# Patient Record
Sex: MALE | Race: OTHER
[De-identification: names, ages, dates, MRNs, and addresses within clinical notes are randomized per-mention and may not be internally consistent; named-entity substitution may affect disease eponyms.]

---

## 2017-07-21 ENCOUNTER — HOSPITAL ENCOUNTER (OUTPATIENT)
Dept: HOSPITAL 62 - OD | Age: 22
End: 2017-07-21
Attending: NURSE PRACTITIONER
Payer: COMMERCIAL
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
## 2017-07-21 DIAGNOSIS — S99.911A: Primary | ICD-10-CM

## 2017-07-21 NOTE — RADIOLOGY REPORT (SQ)
EXAM DESCRIPTION:  ANKLE RIGHT COMPLETE



COMPLETED DATE/TIME:  7/21/2017 1:38 pm



REASON FOR STUDY:  UNSPECIFIED INJURY OF RIGHT ANKLE, INITIAL ENCOUNTER S99.911A  UNSPECIFIED INJURY 
OF RIGHT ANKLE, INITIAL ENCOUNTE



COMPARISON:  None.



NUMBER OF VIEWS:  Three views.



TECHNIQUE:  AP, lateral, and oblique radiographic images acquired of the right ankle.



LIMITATIONS:  None.



FINDINGS:  MINERALIZATION: Normal.

BONES: No acute fracture or dislocation.  No worrisome bone lesions.

JOINTS: No effusions.

SOFT TISSUES: No soft tissue swelling. No foreign body.

OTHER: No other significant finding.



IMPRESSION:  NEGATIVE STUDY OF THE RIGHT ANKLE. NO RADIOGRAPHIC EVIDENCE OF ACUTE INJURY.



TECHNICAL DOCUMENTATION:  JOB ID:  [id number]

 2011 [employer]- All Rights Reserved

## 2018-08-30 ENCOUNTER — HOSPITAL ENCOUNTER (EMERGENCY)
Dept: HOSPITAL 62 - ER | Age: 23
Discharge: HOME | End: 2018-08-30
Payer: COMMERCIAL

## 2018-08-30 VITALS — DIASTOLIC BLOOD PRESSURE: 72 MMHG | SYSTOLIC BLOOD PRESSURE: 125 MMHG

## 2018-08-30 DIAGNOSIS — R19.7: ICD-10-CM

## 2018-08-30 DIAGNOSIS — R53.83: ICD-10-CM

## 2018-08-30 DIAGNOSIS — R10.84: ICD-10-CM

## 2018-08-30 DIAGNOSIS — K92.1: ICD-10-CM

## 2018-08-30 DIAGNOSIS — F12.10: ICD-10-CM

## 2018-08-30 DIAGNOSIS — F17.200: ICD-10-CM

## 2018-08-30 DIAGNOSIS — R11.2: Primary | ICD-10-CM

## 2018-08-30 LAB
ADD MANUAL DIFF: NO
ALBUMIN SERPL-MCNC: 4.5 G/DL (ref 3.5–5)
ALP SERPL-CCNC: 36 U/L (ref 38–126)
ALT SERPL-CCNC: 39 U/L (ref 21–72)
ANION GAP SERPL CALC-SCNC: 12 MMOL/L (ref 5–19)
APPEARANCE UR: CLEAR
APTT PPP: YELLOW S
AST SERPL-CCNC: 72 U/L (ref 17–59)
BASOPHILS # BLD AUTO: 0.1 10^3/UL (ref 0–0.2)
BASOPHILS NFR BLD AUTO: 0.9 % (ref 0–2)
BILIRUB DIRECT SERPL-MCNC: 0.3 MG/DL (ref 0–0.4)
BILIRUB SERPL-MCNC: 0.8 MG/DL (ref 0.2–1.3)
BILIRUB UR QL STRIP: NEGATIVE
BUN SERPL-MCNC: 17 MG/DL (ref 7–20)
CALCIUM: 9.6 MG/DL (ref 8.4–10.2)
CHLORIDE SERPL-SCNC: 105 MMOL/L (ref 98–107)
CO2 SERPL-SCNC: 26 MMOL/L (ref 22–30)
EOSINOPHIL # BLD AUTO: 0 10^3/UL (ref 0–0.6)
EOSINOPHIL NFR BLD AUTO: 0.5 % (ref 0–6)
ERYTHROCYTE [DISTWIDTH] IN BLOOD BY AUTOMATED COUNT: 13.6 % (ref 11.5–14)
GLUCOSE SERPL-MCNC: 87 MG/DL (ref 75–110)
GLUCOSE UR STRIP-MCNC: NEGATIVE MG/DL
HCT VFR BLD CALC: 44 % (ref 37.9–51)
HGB BLD-MCNC: 14.4 G/DL (ref 13.5–17)
KETONES UR STRIP-MCNC: NEGATIVE MG/DL
LIPASE SERPL-CCNC: 41.9 U/L (ref 23–300)
LYMPHOCYTES # BLD AUTO: 1.9 10^3/UL (ref 0.5–4.7)
LYMPHOCYTES NFR BLD AUTO: 24.9 % (ref 13–45)
MCH RBC QN AUTO: 26.9 PG (ref 27–33.4)
MCHC RBC AUTO-ENTMCNC: 32.7 G/DL (ref 32–36)
MCV RBC AUTO: 82 FL (ref 80–97)
MONOCYTES # BLD AUTO: 0.6 10^3/UL (ref 0.1–1.4)
MONOCYTES NFR BLD AUTO: 7.3 % (ref 3–13)
NEUTROPHILS # BLD AUTO: 5.2 10^3/UL (ref 1.7–8.2)
NEUTS SEG NFR BLD AUTO: 66.4 % (ref 42–78)
NITRITE UR QL STRIP: NEGATIVE
PH UR STRIP: 5 [PH] (ref 5–9)
PLATELET # BLD: 200 10^3/UL (ref 150–450)
POTASSIUM SERPL-SCNC: 4.2 MMOL/L (ref 3.6–5)
PROT SERPL-MCNC: 7.4 G/DL (ref 6.3–8.2)
PROT UR STRIP-MCNC: NEGATIVE MG/DL
RBC # BLD AUTO: 5.35 10^6/UL (ref 4.35–5.55)
SODIUM SERPL-SCNC: 143.4 MMOL/L (ref 137–145)
SP GR UR STRIP: 1.03
TOTAL CELLS COUNTED % (AUTO): 100 %
UROBILINOGEN UR-MCNC: 2 MG/DL (ref ?–2)
WBC # BLD AUTO: 7.8 10^3/UL (ref 4–10.5)

## 2018-08-30 PROCEDURE — 99284 EMERGENCY DEPT VISIT MOD MDM: CPT

## 2018-08-30 PROCEDURE — 74022 RADEX COMPL AQT ABD SERIES: CPT

## 2018-08-30 PROCEDURE — 81001 URINALYSIS AUTO W/SCOPE: CPT

## 2018-08-30 PROCEDURE — 96374 THER/PROPH/DIAG INJ IV PUSH: CPT

## 2018-08-30 PROCEDURE — 96361 HYDRATE IV INFUSION ADD-ON: CPT

## 2018-08-30 PROCEDURE — 96375 TX/PRO/DX INJ NEW DRUG ADDON: CPT

## 2018-08-30 PROCEDURE — 80053 COMPREHEN METABOLIC PANEL: CPT

## 2018-08-30 PROCEDURE — 83690 ASSAY OF LIPASE: CPT

## 2018-08-30 PROCEDURE — 36415 COLL VENOUS BLD VENIPUNCTURE: CPT

## 2018-08-30 PROCEDURE — 85025 COMPLETE CBC W/AUTO DIFF WBC: CPT

## 2018-08-30 NOTE — ER DOCUMENT REPORT
ED General





- General


Chief Complaint: Abdominal Pain


Stated Complaint: ABDOMINAL PAIN, BLOOD IN STOOL


Time Seen by Provider: 08/30/18 18:16


Notes: 





Patient is a 23-year-old male without chronic medical problems who presents 

with 1 week of nausea, vomiting, diarrhea, and 24 hours of blood mixed in with 

his stool.  The patient reports an associated generalized, intermittent 

abdominal cramping.  Symptoms have effectively been unchanged since onset.  He 

states eating worsens his symptoms.  Nothing improves his symptoms.  He denies 

a history of similar symptoms in the past.  He has not seen his general doctor 

regarding today's concerns.  He denies associated fever but states he feels 

generally fatigued.


TRAVEL OUTSIDE OF THE [place] IN LAST 30 DAYS: No





- Related Data


Allergies/Adverse Reactions: 


 





No Known Allergies Allergy (Verified 08/30/18 17:47)


 











Past Medical History





- General


Information source: Patient





- Social History


Smoking Status: Current Every Day Smoker


Chew tobacco use (# tins/day): No


Frequency of alcohol use: None


Drug Abuse: Marijuana


Lives with: Parents


Family History: Reviewed & Not Pertinent


Patient has suicidal ideation: No


Patient has homicidal ideation: No


Pulmonary Medical History: 


   Denies: Hx Asthma


Renal/ Medical History: Denies: Hx Peritoneal Dialysis


Past Surgical History: Reports: Hx Oral Surgery - wisdom teeth





- Immunizations


Immunizations up to date: Yes


Hx Diphtheria, Pertussis, Tetanus Vaccination: Yes





Review of Systems





- Review of Systems


Notes: 





Constitutional: Negative for fever.


HENT: Negative for sore throat.


Eyes: Negative for visual changes.


Cardiovascular: Negative for chest pain.


Respiratory: Negative for shortness of breath.


Gastrointestinal: Positive for abdominal pain, vomiting and diarrhea


Genitourinary: Negative for dysuria.


Musculoskeletal: Negative for back pain.


Skin: Negative for rash.


Neurological: Negative for headaches, weakness or numbness.





10 point ROS negative except as marked above and in HPI.





Physical Exam





- Vital signs


Vitals: 


 











Temp Pulse Resp BP Pulse Ox


 


 98.9 F   105 H  16   120/64   100 


 


 08/30/18 17:50  08/30/18 17:50  08/30/18 17:50  08/30/18 17:50  08/30/18 17:50











Interpretation: Tachycardic


Notes: 





PHYSICAL EXAMINATION:





GENERAL: Well-appearing, well-nourished and in no acute distress.





HEAD: Atraumatic, normocephalic.





EYES: Pupils equal round and reactive to light, extraocular movements intact, 

sclera anicteric, conjunctiva are normal.





ENT: nares patent, oropharynx clear without exudates.  Moderately dry mucous 

membranes.





NECK: Normal range of motion, supple without lymphadenopathy





LUNGS: Breath sounds clear to auscultation bilaterally and equal.  No wheezes 

rales or rhonchi.





HEART: Regular rate and rhythm without murmurs





ABDOMEN: Soft, nontender, normoactive bowel sounds.  No guarding, no rebound.  

No masses appreciated.





EXTREMITIES: Normal range of motion, no pitting or edema.  No cyanosis.





NEUROLOGICAL: No focal neurological deficits. Moves all extremities 

spontaneously and on command.





PSYCH: Normal mood, normal affect.





SKIN: Warm, Dry, normal turgor, no rashes or lesions noted.





Course





- Re-evaluation


Re-evalutation: 





08/30/18 19:27


Presentation of an overall well-appearing patient in no acute distress with 

complaints of nausea, vomiting, diarrhea.  Patient has had several stools with 

blood in them as well as symptoms for almost 1 week become increasingly 

suspicious for a bacterial source of his symptoms.  Patient has no abdominal 

tenderness on exam and specifically no tenderness in the RLQ, LLQ, RUQ.  

Overall well hydrated on exam.  Able to tolerate oral intake here in the 

emergency department. Low clinical suspicion for any acute life-threatening 

etiology based on exam and history including acute cholecystitis, SBO, 

appendicitis, nephrolithiasis, or pylonephritis. CMP without evidence of acute 

hepatitis or significant dehydration.  Will trial a course of ciprofloxacin 

given the duration of his illness and component of bloody diarrhea.  At this 

time will discharge with return precautions and follow-up recommendations.  

Verbal discharge instructions given a the bedside and opportunity for questions 

given. Medication warnings reviewed. Patient is in agreement with this plan and 

has verbalized understanding of return precautions and the need for primary 

care follow-up in the next 24-72 hours.





- Vital Signs


Vital signs: 


 











Temp Pulse Resp BP Pulse Ox


 


 98.2 F   105 H  18   125/72   99 


 


 08/30/18 21:10  08/30/18 17:50  08/30/18 21:10  08/30/18 21:10  08/30/18 21:10














- Laboratory


Result Diagrams: 


 08/30/18 18:39





 08/30/18 18:39


Laboratory results interpreted by me: 


 











  08/30/18 08/30/18 08/30/18





  18:15 18:39 18:39


 


MCH    26.9 L


 


AST   72 H 


 


Alkaline Phosphatase   36 L 


 


Urine Urobilinogen  2.0 H  














Discharge





- Discharge


Clinical Impression: 


 Nausea vomiting and diarrhea, Bloody diarrhea





Condition: Good


Disposition: HOME, SELF-CARE


Additional Instructions: 


Your symptoms are suspicious for a bacterial infection due to blood in your 

stool as well as the duration of your symptoms.  You are being prescribed 

ciprofloxacin which she should take for the next 3 days.  You can take over-the-

counter loperamide also known as Imodium as needed for diarrhea per box 

instructions.  You can take the Levsin has been prescribed for abdominal 

cramping.  Continue to stay hydrated with plenty of solution such as Gatorade 

or Pedialyte.  You are being sent home with Zofran to take as needed for nausea 

and vomiting.  Please return if you develop severe abdominal pain, have 

localization of your abdominal pain, pass out, become unable to tolerate any 

oral fluids for 12 more hours, or any other symptoms that are concerning to you.


Prescriptions: 


Hyoscyamine Sulfate [Levsin-Sl] 0.125 mg SL Q12HP PRN #30 tab.subl


 PRN Reason: 


Ciprofloxacin HCl [Cipro 500 mg Tablet] 500 mg PO BID #6 tablet


Forms:  Return to Work

## 2018-08-30 NOTE — ER DOCUMENT REPORT
ED Medical Screen (RME)





- General


Chief Complaint: Abdominal Pain


Stated Complaint: ABDOMINAL PAIN, BLOOD IN STOOL


Time Seen by Provider: 08/30/18 18:16


Notes: 





23 years old male presents today with abdominal cramping nausea vomiting and 

diarrhea as well as bloody stool last night and this morning.


No fever chills or other constitutional symptoms.


Has not traveled recently, has not been on any antibiotic recently.


TRAVEL OUTSIDE OF THE [place] IN LAST 30 DAYS: No





- Related Data


Allergies/Adverse Reactions: 


 





No Known Allergies Allergy (Verified 08/30/18 17:47)


 











Past Medical History





- Social History


Chew tobacco use (# tins/day): No


Frequency of alcohol use: None


Drug Abuse: Marijuana


Pulmonary Medical History: 


   Denies: Hx Asthma


Renal/ Medical History: Denies: Hx Peritoneal Dialysis


Past Surgical History: Reports: Hx Oral Surgery - wisdom teeth





- Immunizations


Immunizations up to date: Yes


Hx Diphtheria, Pertussis, Tetanus Vaccination: Yes





Physical Exam





- Vital signs


Vitals: 





 











Temp Pulse Resp BP Pulse Ox


 


 98.9 F   105 H  16   120/64   100 


 


 08/30/18 17:50  08/30/18 17:50  08/30/18 17:50  08/30/18 17:50  08/30/18 17:50














Course





- Vital Signs


Vital signs: 





 











Temp Pulse Resp BP Pulse Ox


 


 98.9 F   105 H  16   120/64   100 


 


 08/30/18 17:50  08/30/18 17:50  08/30/18 17:50  08/30/18 17:50  08/30/18 17:50

## 2018-08-30 NOTE — RADIOLOGY REPORT (SQ)
EXAM DESCRIPTION:  ACUTE ABDOMEN SERIES



COMPLETED DATE/TIME:  8/30/2018 6:54 pm



REASON FOR STUDY:  Abdominal pain



COMPARISON:  None.



NUMBER OF VIEWS:  Three views.



TECHNIQUE:  Frontal chest, supine abdomen and upright/decubitus abdomen radiographic images acquired.




LIMITATIONS:  None.



FINDINGS:  CHEST: Lungs clear of infiltrates.

FREE AIR: None. No abnormal gas collections.

BOWEL GAS PATTERN: Nonobstructive pattern. No dilated loops or air fluid levels.

CALCIFICATIONS: No suspicious calcifications.

HARDWARE: None in the abdomen.

SOFT TISSUES: No gross mass or suggestion of organomegaly.

BONES: No acute fracture.  No worrisome bone lesions.

OTHER: No other significant finding.



IMPRESSION:  NO RADIOGRAPHIC EVIDENCE FOR ACUTE ABDOMINAL DISEASE.



TECHNICAL DOCUMENTATION:  JOB ID:  [id number]

 2011 [employer]- All Rights Reserved



Reading location - IP/workstation name: [email or web address]

## 2018-12-14 ENCOUNTER — HOSPITAL ENCOUNTER (EMERGENCY)
Dept: HOSPITAL 62 - ER | Age: 23
Discharge: HOME | End: 2018-12-14
Payer: COMMERCIAL

## 2018-12-14 VITALS — SYSTOLIC BLOOD PRESSURE: 124 MMHG | DIASTOLIC BLOOD PRESSURE: 73 MMHG

## 2018-12-14 DIAGNOSIS — R31.9: ICD-10-CM

## 2018-12-14 DIAGNOSIS — H10.33: Primary | ICD-10-CM

## 2018-12-14 DIAGNOSIS — F17.200: ICD-10-CM

## 2018-12-14 DIAGNOSIS — N36.9: ICD-10-CM

## 2018-12-14 LAB
APPEARANCE UR: (no result)
APTT PPP: YELLOW S
BILIRUB UR QL STRIP: NEGATIVE
GLUCOSE UR STRIP-MCNC: NEGATIVE MG/DL
KETONES UR STRIP-MCNC: NEGATIVE MG/DL
NITRITE UR QL STRIP: NEGATIVE
PH UR STRIP: 8 [PH] (ref 5–9)
PROT UR STRIP-MCNC: NEGATIVE MG/DL
SP GR UR STRIP: 1.02
UROBILINOGEN UR-MCNC: 4 MG/DL (ref ?–2)

## 2018-12-14 PROCEDURE — 99283 EMERGENCY DEPT VISIT LOW MDM: CPT

## 2018-12-14 PROCEDURE — 81001 URINALYSIS AUTO W/SCOPE: CPT

## 2018-12-14 NOTE — ER DOCUMENT REPORT
ED Eye Complaint





- General


Chief Complaint: Eye Problem


Stated Complaint: EYE PROBLEM


Time Seen by Provider: 12/14/18 19:48


Mode of Arrival: Ambulatory


Information source: Patient


Notes: 





Chief complaint: Bilateral eye redness, hematuria








History of complain:( obtained from----patient) 23 years old male presents 

today with bilateral eye redness and pain for the last 2 days.


And having on and off blood after intercourse through the urethra.  No fever 

chills or other constitutional symptoms








Onset: Gradual


Duration: Continue with


Severity: Mild to moderate


Quality: Active bowel


Context: As above


Exacerbating factor and relieving factors:











REVIEW OF SYSTEMS:


CONSTITUTIONAL :  Denies fever,  chills, or sweats.  Denies recent illness.


EENT:   Denies eye, ear, throat, or mouth pain or symptoms.  Denies nasal or 

sinus congestion or discharge.  Denies throat, tongue, or mouth swelling or 

difficulty swallowing.


CARDIOVASCULAR:  Denies chest pain.  Denies palpitations or racing or irregular 

heart beat.  Denies ankle edema.


RESPIRATORY:  Denies cough, cold, or chest congestion.  Denies shortness of 

breath, difficulty breathing, or wheezing.


GASTROINTESTINAL:  Denies  distention.  Denies nausea, vomiting, or diarrhea.  

Denies blood in vomitus, stools, or per rectum.  Denies black, tarry stools.  

Denies constipation. 


GENITOURINARY:  Denies difficulty urinating, painful urination, burning, 

frequency, blood in urine, or discharge.


FEMALE  GENITOURINARY:  Denies vaginal bleeding, heavy or abnormal periods, 

irregular periods.  Denies vaginal discharge or odor. 


MUSCULOSKELETAL:  Denies back or neck pain or stiffness.  Denies joint pain or 

swelling.


SKIN:   Denies rash, lesions or sores.


HEMATOLOGIC :   Denies easy bruising or bleeding.


LYMPHATIC:  Denies swollen, enlarged glands.


NEUROLOGICAL:  Denies confusion or altered mental status.  Denies passing out 

or loss of consciousness.  Denies dizziness or lightheadedness.  Denies 

headache.  Denies weakness or paralysis or loss of use of either side.  Denies 

problems with gait or speech.  Denies sensory loss, numbness, or tingling.  

Denies seizures.


PSYCHIATRIC:  Denies anxiety or stress.  Denies depression, suicidal ideation, 

or homicidal ideation.





ALL OTHER SYSTEMS REVIEWED AND NEGATIVE.











PHYSICAL EXAMINATION:





GENERAL: Well-appearing, well-nourished and in no acute distress.





HEAD: Atraumatic, normocephalic.





EYES: Pupils equal round and reactive to light, extraocular movements intact, 

conjunctiva are erythematous bilaterally, no exudates noted.





ENT: Nares patent, oropharynx clear without exudates.  Moist mucous membranes.





NECK: Normal range of motion, supple without lymphadenopathy





LUNGS: Breath sounds clear to auscultation bilaterally and equal.  No wheezes 

rales or rhonchi.





HEART: Regular rate and rhythm without murmurs





ABDOMEN: Soft, nontender, nondistended abdomen.  No guarding, no rebound.  No 

masses appreciated.





Examination of genitals-deferred





Musculoskeletal: Normal range of motion, no pitting or edema.  No cyanosis.





NEUROLOGICAL: Cranial nerves grossly intact.  Normal speech, normal gait.  

Normal sensory, motor exams





PSYCH: Normal mood, normal affect.





SKIN: Warm, Dry, normal turgor, no rashes or lesions noted.

















Dictation was performed using Dragon voice recognition software


TRAVEL OUTSIDE OF THE [place] IN LAST 30 DAYS: No





- HPI


Notes: 





Dictated





- Related Data


Allergies/Adverse Reactions: 


 





No Known Allergies Allergy (Verified 08/30/18 17:47)


 











Past Medical History





- Social History


Smoking Status: Current Every Day Smoker


Cigarette use (# per day): No


Chew tobacco use (# tins/day): No


Smoking Education Provided: No


Frequency of alcohol use: Rare


Drug Abuse: Marijuana


Family History: Reviewed & Not Pertinent


Patient has suicidal ideation: No


Patient has homicidal ideation: No


Pulmonary Medical History: 


   Denies: Hx Asthma


Renal/ Medical History: Denies: Hx Peritoneal Dialysis


Past Surgical History: Reports: Hx Oral Surgery - wisdom teeth





- Immunizations


Immunizations up to date: Yes


Hx Diphtheria, Pertussis, Tetanus Vaccination: Yes





Review of Systems





- Review of Systems


Notes: 





Dictated





Physical Exam





- Vital signs


Vitals: 


 











Temp Pulse Resp BP Pulse Ox


 


 98.8 F   62   17   124/73   98 


 


 12/14/18 19:38  12/14/18 19:38  12/14/18 19:38  12/14/18 19:38  12/14/18 19:38














- Notes


Notes: 





Dictated





Course





- Vital Signs


Vital signs: 


 











Temp Pulse Resp BP Pulse Ox


 


 98.8 F   62   17   124/73   98 


 


 12/14/18 19:38  12/14/18 19:38  12/14/18 19:38  12/14/18 19:38  12/14/18 19:38














- Laboratory


Laboratory results interpreted by me: 


 











  12/14/18





  19:50


 


Urine Urobilinogen  4.0 H














Discharge





- Discharge


Clinical Impression: 


 Urethra disorder





Conjunctivitis


Qualifiers:


 Conjunctivitis type: acute Acute conjunctivitis type: bacterial Laterality: 

bilateral Qualified Code(s): H10.33 - Unspecified acute conjunctivitis, 

bilateral





Condition: Fair


Disposition: HOME, SELF-CARE


Instructions:  Conjunctivitis ([facility])


Prescriptions: 


Tobramycin Sulfate [Tobrex 0.3% Oph Soln 5 Ml] 2 drop OU Q4 7 Days #1 bottle